# Patient Record
Sex: MALE | Race: WHITE | NOT HISPANIC OR LATINO | Employment: FULL TIME | URBAN - METROPOLITAN AREA
[De-identification: names, ages, dates, MRNs, and addresses within clinical notes are randomized per-mention and may not be internally consistent; named-entity substitution may affect disease eponyms.]

---

## 2024-07-21 ENCOUNTER — HOSPITAL ENCOUNTER (EMERGENCY)
Facility: HOSPITAL | Age: 24
Discharge: HOME | End: 2024-07-21
Attending: EMERGENCY MEDICINE | Admitting: EMERGENCY MEDICINE
Payer: COMMERCIAL

## 2024-07-21 ENCOUNTER — APPOINTMENT (OUTPATIENT)
Dept: GENERAL RADIOLOGY | Facility: HOSPITAL | Age: 24
End: 2024-07-21
Payer: COMMERCIAL

## 2024-07-21 VITALS
OXYGEN SATURATION: 97 % | RESPIRATION RATE: 16 BRPM | HEIGHT: 65 IN | WEIGHT: 148 LBS | DIASTOLIC BLOOD PRESSURE: 67 MMHG | HEART RATE: 55 BPM | TEMPERATURE: 98.1 F | BODY MASS INDEX: 24.66 KG/M2 | SYSTOLIC BLOOD PRESSURE: 106 MMHG

## 2024-07-21 DIAGNOSIS — R10.13 EPIGASTRIC PAIN: ICD-10-CM

## 2024-07-21 DIAGNOSIS — R07.9 CHEST PAIN, UNSPECIFIED TYPE: Primary | ICD-10-CM

## 2024-07-21 LAB
ALBUMIN SERPL-MCNC: 4.4 G/DL (ref 3.5–5.2)
ALBUMIN/GLOB SERPL: 1.8 G/DL
ALP SERPL-CCNC: 59 U/L (ref 39–117)
ALT SERPL W P-5'-P-CCNC: 21 U/L (ref 1–41)
ANION GAP SERPL CALCULATED.3IONS-SCNC: 8 MMOL/L (ref 5–15)
AST SERPL-CCNC: 21 U/L (ref 1–40)
BASOPHILS # BLD AUTO: 0.05 10*3/MM3 (ref 0–0.2)
BASOPHILS NFR BLD AUTO: 0.6 % (ref 0–1.5)
BILIRUB SERPL-MCNC: 0.6 MG/DL (ref 0–1.2)
BUN SERPL-MCNC: 13 MG/DL (ref 6–20)
BUN/CREAT SERPL: 10.8 (ref 7–25)
CALCIUM SPEC-SCNC: 9 MG/DL (ref 8.6–10.5)
CHLORIDE SERPL-SCNC: 102 MMOL/L (ref 98–107)
CO2 SERPL-SCNC: 29 MMOL/L (ref 22–29)
CREAT SERPL-MCNC: 1.2 MG/DL (ref 0.76–1.27)
D DIMER PPP FEU-MCNC: <0.27 MCGFEU/ML (ref 0–0.5)
DEPRECATED RDW RBC AUTO: 38.6 FL (ref 37–54)
EGFRCR SERPLBLD CKD-EPI 2021: 86.6 ML/MIN/1.73
EOSINOPHIL # BLD AUTO: 0.32 10*3/MM3 (ref 0–0.4)
EOSINOPHIL NFR BLD AUTO: 3.6 % (ref 0.3–6.2)
ERYTHROCYTE [DISTWIDTH] IN BLOOD BY AUTOMATED COUNT: 12.3 % (ref 12.3–15.4)
GEN 5 2HR TROPONIN T REFLEX: 7 NG/L
GLOBULIN UR ELPH-MCNC: 2.5 GM/DL
GLUCOSE SERPL-MCNC: 90 MG/DL (ref 65–99)
HCT VFR BLD AUTO: 52.8 % (ref 37.5–51)
HGB BLD-MCNC: 17.9 G/DL (ref 13–17.7)
HOLD SPECIMEN: NORMAL
IMM GRANULOCYTES # BLD AUTO: 0.02 10*3/MM3 (ref 0–0.05)
IMM GRANULOCYTES NFR BLD AUTO: 0.2 % (ref 0–0.5)
LIPASE SERPL-CCNC: 35 U/L (ref 13–60)
LYMPHOCYTES # BLD AUTO: 3.21 10*3/MM3 (ref 0.7–3.1)
LYMPHOCYTES NFR BLD AUTO: 36.1 % (ref 19.6–45.3)
MCH RBC QN AUTO: 29.3 PG (ref 26.6–33)
MCHC RBC AUTO-ENTMCNC: 33.9 G/DL (ref 31.5–35.7)
MCV RBC AUTO: 86.6 FL (ref 79–97)
MONOCYTES # BLD AUTO: 0.7 10*3/MM3 (ref 0.1–0.9)
MONOCYTES NFR BLD AUTO: 7.9 % (ref 5–12)
NEUTROPHILS NFR BLD AUTO: 4.6 10*3/MM3 (ref 1.7–7)
NEUTROPHILS NFR BLD AUTO: 51.6 % (ref 42.7–76)
NRBC BLD AUTO-RTO: 0 /100 WBC (ref 0–0.2)
NT-PROBNP SERPL-MCNC: <36 PG/ML (ref 0–450)
PLATELET # BLD AUTO: 261 10*3/MM3 (ref 140–450)
PMV BLD AUTO: 11 FL (ref 6–12)
POTASSIUM SERPL-SCNC: 4 MMOL/L (ref 3.5–5.2)
PROT SERPL-MCNC: 6.9 G/DL (ref 6–8.5)
RBC # BLD AUTO: 6.1 10*6/MM3 (ref 4.14–5.8)
SODIUM SERPL-SCNC: 139 MMOL/L (ref 136–145)
TROPONIN T DELTA: -1 NG/L
TROPONIN T SERPL HS-MCNC: 8 NG/L
WBC NRBC COR # BLD AUTO: 8.9 10*3/MM3 (ref 3.4–10.8)
WHOLE BLOOD HOLD COAG: NORMAL
WHOLE BLOOD HOLD SPECIMEN: NORMAL

## 2024-07-21 PROCEDURE — 96374 THER/PROPH/DIAG INJ IV PUSH: CPT

## 2024-07-21 PROCEDURE — 84484 ASSAY OF TROPONIN QUANT: CPT | Performed by: EMERGENCY MEDICINE

## 2024-07-21 PROCEDURE — 36415 COLL VENOUS BLD VENIPUNCTURE: CPT

## 2024-07-21 PROCEDURE — 71045 X-RAY EXAM CHEST 1 VIEW: CPT

## 2024-07-21 PROCEDURE — 83690 ASSAY OF LIPASE: CPT | Performed by: EMERGENCY MEDICINE

## 2024-07-21 PROCEDURE — 93005 ELECTROCARDIOGRAM TRACING: CPT | Performed by: EMERGENCY MEDICINE

## 2024-07-21 PROCEDURE — 85025 COMPLETE CBC W/AUTO DIFF WBC: CPT | Performed by: EMERGENCY MEDICINE

## 2024-07-21 PROCEDURE — 25810000003 SODIUM CHLORIDE 0.9 % SOLUTION: Performed by: NURSE PRACTITIONER

## 2024-07-21 PROCEDURE — 85379 FIBRIN DEGRADATION QUANT: CPT | Performed by: EMERGENCY MEDICINE

## 2024-07-21 PROCEDURE — 80053 COMPREHEN METABOLIC PANEL: CPT | Performed by: EMERGENCY MEDICINE

## 2024-07-21 PROCEDURE — 83880 ASSAY OF NATRIURETIC PEPTIDE: CPT | Performed by: EMERGENCY MEDICINE

## 2024-07-21 PROCEDURE — 96361 HYDRATE IV INFUSION ADD-ON: CPT

## 2024-07-21 PROCEDURE — 99284 EMERGENCY DEPT VISIT MOD MDM: CPT

## 2024-07-21 RX ORDER — SUCRALFATE 1 G/1
1 TABLET ORAL ONCE
Status: COMPLETED | OUTPATIENT
Start: 2024-07-21 | End: 2024-07-21

## 2024-07-21 RX ORDER — FAMOTIDINE 10 MG/ML
20 INJECTION, SOLUTION INTRAVENOUS ONCE
Status: COMPLETED | OUTPATIENT
Start: 2024-07-21 | End: 2024-07-21

## 2024-07-21 RX ORDER — SODIUM CHLORIDE 0.9 % (FLUSH) 0.9 %
10 SYRINGE (ML) INJECTION AS NEEDED
Status: DISCONTINUED | OUTPATIENT
Start: 2024-07-21 | End: 2024-07-21 | Stop reason: HOSPADM

## 2024-07-21 RX ORDER — SUCRALFATE 1 G/1
1 TABLET ORAL 4 TIMES DAILY
Qty: 40 TABLET | Refills: 0 | Status: SHIPPED | OUTPATIENT
Start: 2024-07-21 | End: 2024-07-31

## 2024-07-21 RX ORDER — ASPIRIN 81 MG/1
324 TABLET, CHEWABLE ORAL ONCE
Status: COMPLETED | OUTPATIENT
Start: 2024-07-21 | End: 2024-07-21

## 2024-07-21 RX ADMIN — FAMOTIDINE 20 MG: 10 INJECTION, SOLUTION INTRAVENOUS at 14:23

## 2024-07-21 RX ADMIN — SUCRALFATE 1 G: 1 TABLET ORAL at 11:35

## 2024-07-21 RX ADMIN — SODIUM CHLORIDE 1000 ML: 9 INJECTION, SOLUTION INTRAVENOUS at 14:22

## 2024-07-21 RX ADMIN — ASPIRIN 324 MG: 81 TABLET, CHEWABLE ORAL at 10:45

## 2024-07-21 NOTE — ED PROVIDER NOTES
EMERGENCY DEPARTMENT ENCOUNTER    Pt Name: Martínez Romano  MRN: [id number]  Pt :   2000  Room Number:    Date of encounter:  2024  PCP: Provider, No Known  ED Provider: PARUL Ellis    Historian: Patient      HPI:  Chief Complaint: Epigastric chest pain        Context: Martínez Romano is a 24 y.o. male who presents to the ED c/o midsternal epigastric pain since early morning.  Patient describes pain as burning, squeezing sensation.  Had similar episodes in the past, attributed to heartburn.  States today felt tingling to the arms that prompted him to come for evaluation.  Denies shortness of breath, nausea, denies cardiac history.  Social drinker, vapes, denies drug use.  Took Nexium that was prescribed last year for similar symptoms.  No significant relief.      PAST MEDICAL HISTORY  History reviewed. No pertinent past medical history.      PAST SURGICAL HISTORY  Past Surgical History:   Procedure Laterality Date    DENTAL PROCEDURE           FAMILY HISTORY  History reviewed. No pertinent family history.      SOCIAL HISTORY  Social History     Socioeconomic History    Marital status: Unknown   Tobacco Use    Smoking status: Never    Smokeless tobacco: Never   Vaping Use    Vaping status: Every Day    Substances: Nicotine   Substance and Sexual Activity    Alcohol use: Yes     Comment: WISDOM TEETH AND CYST REMOVED FROM GUM    Drug use: Never    Sexual activity: Defer         ALLERGIES  Patient has no known allergies.        REVIEW OF SYSTEMS  Review of Systems       All systems reviewed and negative except for those discussed in HPI.       PHYSICAL EXAM    I have reviewed the triage vital signs and nursing notes.    ED Triage Vitals [24 1023]   Temp Heart Rate Resp BP SpO2   98.1 °F (36.7 °C) (!) 133 16 146/96 100 %      Temp src Heart Rate Source Patient Position BP Location FiO2 (%)   Oral Monitor Sitting Right arm --       Physical Exam  GENERAL:   Appears in no acute distress.   Anxious  HENT: Nares patent.  EYES: No scleral icterus.  CV: Regular rhythm, regular rate.  RESPIRATORY: Normal effort.  No audible wheezes, rales or rhonchi.  ABDOMEN: Soft, nontender  MUSCULOSKELETAL: No deformities.   NEURO: Alert, moves all extremities, follows commands.  SKIN: Warm, dry, no rash visualized.      LAB RESULTS  Recent Results (from the past 24 hour(s))   ECG 12 Lead ED Triage Standing Order; Chest Pain    Collection Time: 07/21/24 10:28 AM   Result Value Ref Range    QT Interval 312 ms    QTC Interval 406 ms   Lavender Top    Collection Time: 07/21/24 10:39 AM   Result Value Ref Range    Extra Tube hold for add-on    Gold Top - SST    Collection Time: 07/21/24 10:39 AM   Result Value Ref Range    Extra Tube Hold for add-ons.    Gray Top    Collection Time: 07/21/24 10:39 AM   Result Value Ref Range    Extra Tube Hold for add-ons.    Light Blue Top    Collection Time: 07/21/24 10:39 AM   Result Value Ref Range    Extra Tube Hold for add-ons.    CBC Auto Differential    Collection Time: 07/21/24 10:39 AM    Specimen: Blood   Result Value Ref Range    WBC 8.90 3.40 - 10.80 10*3/mm3    RBC 6.10 (H) 4.14 - 5.80 10*6/mm3    Hemoglobin 17.9 (H) 13.0 - 17.7 g/dL    Hematocrit 52.8 (H) 37.5 - 51.0 %    MCV 86.6 79.0 - 97.0 fL    MCH 29.3 26.6 - 33.0 pg    MCHC 33.9 31.5 - 35.7 g/dL    RDW 12.3 12.3 - 15.4 %    RDW-SD 38.6 37.0 - 54.0 fl    MPV 11.0 6.0 - 12.0 fL    Platelets 261 140 - 450 10*3/mm3    Neutrophil % 51.6 42.7 - 76.0 %    Lymphocyte % 36.1 19.6 - 45.3 %    Monocyte % 7.9 5.0 - 12.0 %    Eosinophil % 3.6 0.3 - 6.2 %    Basophil % 0.6 0.0 - 1.5 %    Immature Grans % 0.2 0.0 - 0.5 %    Neutrophils, Absolute 4.60 1.70 - 7.00 10*3/mm3    Lymphocytes, Absolute 3.21 (H) 0.70 - 3.10 10*3/mm3    Monocytes, Absolute 0.70 0.10 - 0.90 10*3/mm3    Eosinophils, Absolute 0.32 0.00 - 0.40 10*3/mm3    Basophils, Absolute 0.05 0.00 - 0.20 10*3/mm3    Immature Grans, Absolute 0.02 0.00 - 0.05 10*3/mm3    nRBC  0.0 0.0 - 0.2 /100 WBC   High Sensitivity Troponin T    Collection Time: 07/21/24 11:39 AM    Specimen: Blood   Result Value Ref Range    HS Troponin T 8 <22 ng/L   Comprehensive Metabolic Panel    Collection Time: 07/21/24 11:39 AM    Specimen: Blood   Result Value Ref Range    Glucose 90 65 - 99 mg/dL    BUN 13 6 - 20 mg/dL    Creatinine 1.20 0.76 - 1.27 mg/dL    Sodium 139 136 - 145 mmol/L    Potassium 4.0 3.5 - 5.2 mmol/L    Chloride 102 98 - 107 mmol/L    CO2 29.0 22.0 - 29.0 mmol/L    Calcium 9.0 8.6 - 10.5 mg/dL    Total Protein 6.9 6.0 - 8.5 g/dL    Albumin 4.4 3.5 - 5.2 g/dL    ALT (SGPT) 21 1 - 41 U/L    AST (SGOT) 21 1 - 40 U/L    Alkaline Phosphatase 59 39 - 117 U/L    Total Bilirubin 0.6 0.0 - 1.2 mg/dL    Globulin 2.5 gm/dL    A/G Ratio 1.8 g/dL    BUN/Creatinine Ratio 10.8 7.0 - 25.0    Anion Gap 8.0 5.0 - 15.0 mmol/L    eGFR 86.6 >60.0 mL/min/1.73   Lipase    Collection Time: 07/21/24 11:39 AM    Specimen: Blood   Result Value Ref Range    Lipase 35 13 - 60 U/L   BNP    Collection Time: 07/21/24 11:39 AM    Specimen: Blood   Result Value Ref Range    proBNP <36.0 0.0 - 450.0 pg/mL   Green Top (Gel)    Collection Time: 07/21/24 11:39 AM   Result Value Ref Range    Extra Tube Hold for add-ons.    D-dimer, Quantitative    Collection Time: 07/21/24 11:39 AM    Specimen: Blood   Result Value Ref Range    D-Dimer, Quantitative <0.27 0.00 - 0.50 MCGFEU/mL   High Sensitivity Troponin T 2Hr    Collection Time: 07/21/24  1:50 PM    Specimen: Arm, Right; Blood   Result Value Ref Range    HS Troponin T 7 <22 ng/L    Troponin T Delta -1 >=-4 - <+4 ng/L   ECG 12 Lead ED Triage Standing Order; Chest Pain    Collection Time: 07/21/24  1:54 PM   Result Value Ref Range    QT Interval 366 ms    QTC Interval 362 ms       If labs were ordered, I independently reviewed the results and considered them in treating the patient.        RADIOLOGY  XR Chest 1 View    Result Date: 7/21/2024  XR CHEST 1 VW Date of Exam: 7/21/2024  10:41 AM EDT Indication: Chest Pain Triage Protocol Comparison: None available. Findings: Heart size and pulmonary vasculature are within normal limits. Lungs clear. Costophrenic angles are sharp. No pneumothorax     Impression: No active cardiopulmonary disease Electronically Signed: Estuardo Vides  7/21/2024 10:50 AM EDT  Workstation ID: [id number]     I ordered and independently reviewed the above noted radiographic studies.          PROCEDURES    Procedures    ECG 12 Lead ED Triage Standing Order; Chest Pain   Preliminary Result   Test Reason : ED Triage Standing Order~   Blood Pressure :   */*   mmHG   Vent. Rate :  59 BPM     Atrial Rate :  59 BPM      P-R Int : 144 ms          QRS Dur :  78 ms       QT Int : 366 ms       P-R-T Axes :  36  46  47 degrees      QTc Int : 362 ms      Sinus bradycardia   Otherwise normal ECG   When compared with ECG of 21-JUL-2024 10:28, (Unconfirmed)   Vent. rate has decreased BY  43 BPM      Referred By:            Confirmed By:       ECG 12 Lead ED Triage Standing Order; Chest Pain   Preliminary Result   Test Reason : ED Triage Standing Order~   Blood Pressure :   */*   mmHG   Vent. Rate : 102 BPM     Atrial Rate : 102 BPM      P-R Int : 162 ms          QRS Dur :  78 ms       QT Int : 312 ms       P-R-T Axes :  76  53  55 degrees      QTc Int : 406 ms      Sinus tachycardia   Otherwise normal ECG   No previous ECGs available      Referred By: EDMD           Confirmed By:           MEDICATIONS GIVEN IN ER    Medications   sodium chloride 0.9 % flush 10 mL (has no administration in time range)   sodium chloride 0.9 % bolus 1,000 mL (1,000 mL Intravenous New Bag 7/21/24 1422)   aspirin chewable tablet 324 mg (324 mg Oral Given 7/21/24 1045)   sucralfate (CARAFATE) tablet 1 g (1 g Oral Given 7/21/24 1135)   famotidine (PEPCID) injection 20 mg (20 mg Intravenous Given 7/21/24 1423)         MEDICAL DECISION MAKING, PROGRESS, and CONSULTS    All labs, if obtained, have been independently  reviewed by me.  All radiology studies, if obtained, have been reviewed by me and the radiologist dictating the report.  All EKG's, if obtained, have been independently viewed and interpreted by me/my attending physician.      Discussion below represents my analysis of pertinent findings related to patient's condition, differential diagnosis, treatment plan and final disposition.  Patient is a 24-year-old healthy male who presents for evaluation of epigastric chest pain since this morning.  On physical exam he was nontoxic-appearing with stable vital signs, abdomen was soft nontender on palpation, lab work was unremarkable including delta troponins and D-dimer, mild hemoconcentration noted, EKG shows normal sinus rhythm, chest x-ray and no acute cardiopulmonary process.  Patient received fluids, Pepcid, p.o. Carafate with improvement of the symptoms.  He reported slight tightness at the epigastrium without any pain.  I believe his pain is GI in nature.  Discharged home in stable condition with prescription for Carafate, advised to follow-up with PCP, GI for further evaluation.  Return precaution for any worsening discussed.        HEART Score: 0                Differential diagnosis:    Angina, esophagitis, GERD, hiatal hernia, gastritis, costochondritis    Additional sources:    - Discussed/ obtained information from independent historians:      - External (non-ED) record review:      - Chronic or social conditions impacting care:      - Shared decision making: Patient      Orders placed during this visit:  Orders Placed This Encounter   Procedures    XR Chest 1 View    [place] Draw    High Sensitivity Troponin T    Comprehensive Metabolic Panel    Lipase    BNP    CBC Auto Differential    D-dimer, Quantitative    High Sensitivity Troponin T 2Hr    NPO Diet NPO Type: Strict NPO    Undress & Gown    Continuous Pulse Oximetry    Oxygen Therapy- Nasal Cannula; Titrate 1-6 LPM Per SpO2; 90 - 95%    ECG 12 Lead ED  Triage Standing Order; Chest Pain    ECG 12 Lead ED Triage Standing Order; Chest Pain    Insert Peripheral IV    CBC & Differential    Green Top (Gel)    Lavender Top    Gold Top - SST    Gray Top    Light Blue Top         Additional orders considered but not ordered  Chest CT    ED Course:    Consultants:                  Shared Decision Making:  After my consideration of clinical presentation and any laboratory/radiology studies obtained, I discussed the findings with the patient/patient representative who is in agreement with the treatment plan and the final disposition.   Risks and benefits of discharge and/or observation/admission were discussed.       AS OF 15:03 EDT VITALS:    BP - 111/70  HR - 63  TEMP - 98.1 °F (36.7 °C) (Oral)  O2 SATS - 98%                  DIAGNOSIS  Final diagnoses:   Epigastric pain   Chest pain, unspecified type         DISPOSITION  DISCHARGE    Patient discharged in stable condition.    Reviewed implications of results, diagnosis, meds, responsibility to follow up, warning signs and symptoms of possible worsening, potential complications and reasons to return to ER.    Patient/Family voiced understanding of above instructions.    Discussed plan for discharge, as there is no emergent indication for admission.  Pt/family is agreeable and understands need for follow up and possible repeat testing.  Pt/family is aware that discharge does not mean that nothing is wrong but that it indicates no emergency is currently present that requires admission and they must continue care with follow-up as given below or with a physician of their choice.     FOLLOW-UP  Provider, No Known  [address] EMERGENCY DEPARTMENT  [address] [facility] [address] [phone]    If symptoms worsen         Medication List        New Prescriptions      sucralfate 1 g tablet  Commonly known as: CARAFATE  Take 1 tablet by mouth 4  (Four) Times a Day for 10 days.               Where to Get Your Medications        These medications were sent to [facility] [id number] - [place], [place] - [address] - [phone]  - [phone]  [address], [facility] [address]      Phone: [phone]   sucralfate 1 g tablet            Please note that portions of this document were completed with voice recognition software.     PARUL Ellis   07/21/24   15:03 EDT        Anika Mcqueen, APRDENIA  07/21/24 1500

## 2024-07-24 LAB
QT INTERVAL: 312 MS
QT INTERVAL: 366 MS
QTC INTERVAL: 362 MS
QTC INTERVAL: 406 MS

## 2024-09-12 ENCOUNTER — OFFICE VISIT (OUTPATIENT)
Dept: GASTROENTEROLOGY | Facility: CLINIC | Age: 24
End: 2024-09-12
Payer: COMMERCIAL

## 2024-09-12 VITALS
BODY MASS INDEX: 25.16 KG/M2 | WEIGHT: 151 LBS | DIASTOLIC BLOOD PRESSURE: 60 MMHG | OXYGEN SATURATION: 98 % | SYSTOLIC BLOOD PRESSURE: 124 MMHG | HEART RATE: 57 BPM | HEIGHT: 65 IN

## 2024-09-12 DIAGNOSIS — R07.9 CHEST PAIN, UNSPECIFIED TYPE: ICD-10-CM

## 2024-09-12 DIAGNOSIS — R10.13 EPIGASTRIC PAIN: Primary | ICD-10-CM

## 2024-09-12 DIAGNOSIS — K62.5 BLOOD PER RECTUM: ICD-10-CM

## 2024-09-12 DIAGNOSIS — K59.04 CHRONIC IDIOPATHIC CONSTIPATION: ICD-10-CM

## 2024-09-12 RX ORDER — POLYETHYLENE GLYCOL 3350 17 G/17G
17 POWDER, FOR SOLUTION ORAL DAILY
Qty: 510 G | Refills: 11 | Status: SHIPPED | OUTPATIENT
Start: 2024-09-12

## 2024-09-12 NOTE — PATIENT INSTRUCTIONS
Follow a diet as recommended by your health care provider. This may involve avoiding foods and drinks such as:  Coffee and tea (with or without caffeine).  Drinks that contain alcohol.  Energy drinks and sports drinks.  Carbonated drinks or sodas.  Chocolate and cocoa.  Peppermint and mint flavorings.  Garlic and onions.  Horseradish.  Spicy and acidic foods, including peppers, chili powder, winston powder, vinegar, hot sauces, and barbecue sauce.  Citrus fruit juices and citrus fruits, such as oranges, sophie, and limes.  Tomato-based foods, such as red sauce, chili, salsa, and pizza with red sauce.  Fried and fatty foods, such as donuts, french fries, potato chips, and high-fat dressings.    Eat small, frequent meals instead of large meals.  Avoid drinking large amounts of liquid with your meals.  Avoid eating meals during the 2-3 hours before bedtime.  Avoid lying down right after you eat.

## 2024-10-25 ENCOUNTER — OUTSIDE FACILITY SERVICE (OUTPATIENT)
Dept: GASTROENTEROLOGY | Facility: CLINIC | Age: 24
End: 2024-10-25
Payer: COMMERCIAL

## 2024-10-25 PROCEDURE — 43239 EGD BIOPSY SINGLE/MULTIPLE: CPT | Performed by: INTERNAL MEDICINE

## 2024-10-25 PROCEDURE — 88305 TISSUE EXAM BY PATHOLOGIST: CPT | Performed by: INTERNAL MEDICINE

## 2024-10-28 ENCOUNTER — LAB REQUISITION (OUTPATIENT)
Dept: LAB | Facility: HOSPITAL | Age: 24
End: 2024-10-28
Payer: COMMERCIAL

## 2024-10-28 DIAGNOSIS — R10.13 EPIGASTRIC PAIN: ICD-10-CM

## 2024-10-28 DIAGNOSIS — K21.00 GASTRO-ESOPHAGEAL REFLUX DISEASE WITH ESOPHAGITIS, WITHOUT BLEEDING: ICD-10-CM

## 2024-10-30 LAB — REF LAB TEST METHOD: NORMAL

## 2024-11-27 ENCOUNTER — OFFICE VISIT (OUTPATIENT)
Dept: GASTROENTEROLOGY | Facility: CLINIC | Age: 24
End: 2024-11-27
Payer: COMMERCIAL

## 2024-11-27 VITALS
DIASTOLIC BLOOD PRESSURE: 60 MMHG | BODY MASS INDEX: 26.33 KG/M2 | WEIGHT: 158 LBS | HEIGHT: 65 IN | OXYGEN SATURATION: 97 % | SYSTOLIC BLOOD PRESSURE: 110 MMHG | HEART RATE: 68 BPM

## 2024-11-27 DIAGNOSIS — K21.00 GASTROESOPHAGEAL REFLUX DISEASE WITH ESOPHAGITIS WITHOUT HEMORRHAGE: ICD-10-CM

## 2024-11-27 DIAGNOSIS — L53.9 ERYTHEMA OF SKIN: ICD-10-CM

## 2024-11-27 DIAGNOSIS — R07.9 CHEST PAIN, UNSPECIFIED TYPE: Primary | ICD-10-CM

## 2024-11-27 PROCEDURE — 99214 OFFICE O/P EST MOD 30 MIN: CPT | Performed by: NURSE PRACTITIONER

## 2024-11-27 RX ORDER — PANTOPRAZOLE SODIUM 40 MG/1
1 TABLET, DELAYED RELEASE ORAL DAILY
COMMUNITY
Start: 2024-10-27

## 2024-11-27 NOTE — PROGRESS NOTES
GASTROENTEROLOGY OFFICE NOTE    Martínez Romano  [id number]  2000    CARE TEAM  Patient Care Team:  Provider, No Known as PCP - General    Referring Provider: No ref. provider found    Chief Complaint   Patient presents with    Abdominal Pain     2mth follow up.   Follow up from EGD.     Constipation     2mth follow up.         HISTORY OF PRESENT ILLNESS:   Martínez Romano is a 24 y.o. male (6th grade  at [employer]) who  returns for 2 to 3-month follow-up regarding epigastric pain, chest pain with prior emergency room visit where he was prescribed Carafate, Pepcid.      At last office visit, he reported previously taking omeprazole with improvement in symptoms.  He reported increased intake of fiber and starting fiber supplement with improvement in bowel habits.  At last office visit on 9/12/204,  lifestyle modifications for reflux recommended, omeprazole daily prescribed, EGD planned for additional evaluation.  We also discussed possible differential of irritable bowel syndrome with constipation contributing to symptoms for which I recommended daily dose of MiraLAX or consider MiraLAX up to 3 times daily.      10/25/2024 EGD per Dr. Burch revealed LA grade a reflux esophagitis and otherwise appeared normal.  Biopsy of duodenum without abnormality.  Stomach biopsy without abnormality and negative for H. pylori.  Biopsy of esophagus revealed changes of reflux.    He is unable to recall if he was taking omeprazole daily after appointment in September and prior to EGD.  He reports taking pantoprazole 40 mg daily since EGD.  He has experienced occasional chest tightness but the symptoms that took him to the emergency department prior to last office visit have not returned.    He was recently diagnosed and treated for influenza.  He had rash on left forearm that has resolved.  He now reports areas of redness and swelling to his palms.      Past Medical History:   Diagnosis Date    Reflux  "esophagitis         Past Surgical History:   Procedure Laterality Date    DENTAL PROCEDURE      ENDOSCOPY  10/25/2024    Kiersten        Current Outpatient Medications on File Prior to Visit   Medication Sig    pantoprazole (PROTONIX) 40 MG EC tablet Take 1 tablet by mouth Daily.    [DISCONTINUED] omeprazole (priLOSEC) 20 MG capsule Take 1 capsule by mouth Daily. 30 minutes prior to a meal (Patient not taking: Reported on 11/27/2024)    [DISCONTINUED] polyethylene glycol (MiraLax) 17 GM/SCOOP powder Take 17 g by mouth Daily. Mix in 8 oz of any liquid once daily (Patient not taking: Reported on 11/27/2024)     No current facility-administered medications on file prior to visit.       No Known Allergies    Family History   Problem Relation Age of Onset    Colon cancer Neg Hx        Social History     Socioeconomic History    Marital status: Unknown   Tobacco Use    Smoking status: Never    Smokeless tobacco: Never   Vaping Use    Vaping status: Every Day    Substances: Nicotine   Substance and Sexual Activity    Alcohol use: Yes     Comment: WISDOM TEETH AND CYST REMOVED FROM GUM    Drug use: Never    Sexual activity: Defer       PHYSICAL EXAM   /60 (BP Location: Left arm, Patient Position: Sitting, Cuff Size: Adult)   Pulse 68   Ht 165.1 cm (65\")   Wt 71.7 kg (158 lb)   SpO2 97%   BMI 26.29 kg/m²   Physical Exam  Constitutional:       General: He is not in acute distress.     Appearance: He is not toxic-appearing.   HENT:      Head: Normocephalic and atraumatic. No contusion.      Right Ear: External ear normal.      Left Ear: External ear normal.   Eyes:      General: Lids are normal. No scleral icterus.        Right eye: No discharge.         Left eye: No discharge.      Extraocular Movements: Extraocular movements intact.   Neck:      Trachea: Trachea normal.      Comments: No visible mass  No visible adenopathy  Cardiovascular:      Rate and Rhythm: Normal rate.   Pulmonary:      Effort: No respiratory " distress.      Comments: Symmetrical expansion    Abdominal:      Palpations: Abdomen is soft. There is no mass.      Tenderness: There is no abdominal tenderness.   Musculoskeletal:      Comments: Symmetrical movement of upper extremities  Symmetrical movement of lower extremities  No visible deformities   Skin:     General: Skin is warm and dry.      Coloration: Skin is not jaundiced.      Comments: 2 to 3 areas of erythema noted to palms   Neurological:      General: No focal deficit present.      Mental Status: He is alert and oriented to person, place, and time.   Psychiatric:         Mood and Affect: Mood normal.         Behavior: Behavior normal.         Thought Content: Thought content normal.     Results Review:  7/21/2024 chest x-ray without active cardiopulmonary disease.  Troponin, D-dimer, BNP, lipase normal    10/25/2024 EGD per Dr. Burch revealed LA grade a reflux esophagitis and otherwise appeared normal.  Biopsy of duodenum without abnormality.  Stomach biopsy without abnormality and negative for H. pylori.  Biopsy of esophagus revealed changes of reflux.  CMP          7/21/2024    11:39   CMP   Glucose 90    BUN 13    Creatinine 1.20    EGFR 86.6    Sodium 139    Potassium 4.0    Chloride 102    Calcium 9.0    Total Protein 6.9    Albumin 4.4    Globulin 2.5    Total Bilirubin 0.6    Alkaline Phosphatase 59    AST (SGOT) 21    ALT (SGPT) 21    Albumin/Globulin Ratio 1.8    BUN/Creatinine Ratio 10.8    Anion Gap 8.0      CBC          7/21/2024    10:39   CBC   WBC 8.90    RBC 6.10    Hemoglobin 17.9    Hematocrit 52.8    MCV 86.6    MCH 29.3    MCHC 33.9    RDW 12.3    Platelets 261        ASSESSMENT / PLAN  1. Chest pain, unspecified type  2. Gastroesophageal reflux disease with esophagitis without hemorrhage  -Suspect history of chest pain and epigastric pain to have been due to reflux as EGD on 10/25/2024 revealed LA grade a reflux esophagitis.  -Lifestyle modifications for reflux previously  provided.  He was previously prescribed omeprazole 20 mg daily.  Following EGD Dr. Burch prescribed pantoprazole 40 mg daily which patient continues at this time with improved symptoms.  -We discussed consideration to take pantoprazole for 6 months and then we can consider trial off of medication after 6 months.  Alternatively patient could consider taking pantoprazole 40 mg daily until after the first of the new year and then consider trial off pantoprazole.  -Check CMP and magnesium prior to next office visit due to taking proton pump inhibitor  -We previously discussed differential of symptomatic cholelithiasis or gallbladder sludge for which ultrasound gallbladder could be considered in the future  - Comprehensive Metabolic Panel; Future  - Magnesium; Future  3. Erythema of skin  -Recommend he consider use of over-the-counter steroid such as hydrocortisone cream for up to 7 days.  If no improvement or worsening skin abnormalities recommend he go to primary care provider for evaluation    We previously discussed blood per rectum and chronic constipation with report at last office visit of improvement in bowel habits with increased dietary fiber and taking fiber supplement.  I recommended daily dose of MiraLAX at last office visit.  If he did not have good productive bowel movements once or twice a day he could consider MiraLAX up to 3 times daily, every 4 hours as needed    Return in about 6 months (around 5/27/2025).    Nina Campo, APRN  11/27/2024

## 2025-01-27 ENCOUNTER — PRIOR AUTHORIZATION (OUTPATIENT)
Dept: GASTROENTEROLOGY | Facility: CLINIC | Age: 25
End: 2025-01-27
Payer: COMMERCIAL

## 2025-03-27 ENCOUNTER — HOSPITAL ENCOUNTER (EMERGENCY)
Facility: HOSPITAL | Age: 25
Discharge: HOME | End: 2025-03-27
Attending: HOSPITALIST
Payer: COMMERCIAL

## 2025-03-27 VITALS
HEART RATE: 69 BPM | TEMPERATURE: 98.4 F | DIASTOLIC BLOOD PRESSURE: 72 MMHG | OXYGEN SATURATION: 100 % | SYSTOLIC BLOOD PRESSURE: 117 MMHG | HEIGHT: 65 IN | BODY MASS INDEX: 24.99 KG/M2 | RESPIRATION RATE: 20 BRPM | WEIGHT: 150 LBS

## 2025-03-27 DIAGNOSIS — R19.7 DIARRHEA, UNSPECIFIED TYPE: Primary | ICD-10-CM

## 2025-03-27 DIAGNOSIS — K62.5 RECTAL BLEEDING: ICD-10-CM

## 2025-03-27 LAB
ALBUMIN SERPL-MCNC: 4.8 G/DL (ref 3.4–4.8)
ALBUMIN/GLOB SERPL: 1.7 {RATIO} (ref 0.8–2)
ALP SERPL-CCNC: 72 U/L (ref 25–100)
ALT SERPL-CCNC: 20 U/L (ref 4–36)
ANION GAP SERPL CALCULATED.3IONS-SCNC: 12 MMOL/L (ref 3–16)
AST SERPL-CCNC: 20 U/L (ref 8–33)
BASOPHILS # BLD: 0 K/UL (ref 0–0.1)
BASOPHILS NFR BLD: 0.6 %
BILIRUB SERPL-MCNC: 0.3 MG/DL (ref 0.3–1.2)
BILIRUB UR QL STRIP.AUTO: NEGATIVE
BUN SERPL-MCNC: 11 MG/DL (ref 6–20)
CALCIUM SERPL-MCNC: 9.8 MG/DL (ref 8.3–10.6)
CHLORIDE SERPL-SCNC: 103 MMOL/L (ref 98–107)
CLARITY UR: CLEAR
CO2 SERPL-SCNC: 27 MMOL/L (ref 20–30)
COLOR UR: YELLOW
CREAT SERPL-MCNC: 1.2 MG/DL (ref 0.4–1.2)
EOSINOPHIL # BLD: 0.2 K/UL (ref 0–0.4)
EOSINOPHIL NFR BLD: 2.3 %
ERYTHROCYTE [DISTWIDTH] IN BLOOD BY AUTOMATED COUNT: 12.1 % (ref 11–16)
GFR SERPLBLD CREATININE-BSD FMLA CKD-EPI: 86 ML/MIN/{1.73_M2}
GLOBULIN SER CALC-MCNC: 2.9 G/DL
GLUCOSE SERPL-MCNC: 73 MG/DL (ref 74–106)
GLUCOSE UR STRIP.AUTO-MCNC: NEGATIVE MG/DL
HCT VFR BLD AUTO: 46.9 % (ref 40–54)
HEMOCCULT SP1 STL QL: NORMAL
HGB BLD-MCNC: 15.9 G/DL (ref 13–18)
HGB UR QL STRIP.AUTO: NEGATIVE
IMM GRANULOCYTES # BLD: 0 K/UL
IMM GRANULOCYTES NFR BLD: 0.3 % (ref 0–5)
KETONES UR STRIP.AUTO-MCNC: NEGATIVE MG/DL
LEUKOCYTE ESTERASE UR QL STRIP.AUTO: NEGATIVE
LIPASE SERPL-CCNC: 38 U/L (ref 5.6–51.3)
LYMPHOCYTES # BLD: 1.8 K/UL (ref 1.5–4)
LYMPHOCYTES NFR BLD: 26.4 %
MCH RBC QN AUTO: 29.5 PG (ref 27–32)
MCHC RBC AUTO-ENTMCNC: 33.9 G/DL (ref 31–35)
MCV RBC AUTO: 87 FL (ref 80–100)
MONOCYTES # BLD: 0.8 K/UL (ref 0.2–0.8)
MONOCYTES NFR BLD: 10.8 %
NEUTROPHILS # BLD: 4.1 K/UL (ref 2–7.5)
NEUTS SEG NFR BLD: 59.6 %
NITRITE UR QL STRIP.AUTO: NEGATIVE
PH UR STRIP.AUTO: 7 [PH] (ref 5–8)
PLATELET # BLD AUTO: 265 K/UL (ref 150–400)
PMV BLD AUTO: 10.7 FL (ref 6–10)
POTASSIUM SERPL-SCNC: 3.9 MMOL/L (ref 3.4–5.1)
PROT SERPL-MCNC: 7.7 G/DL (ref 6.4–8.3)
PROT UR STRIP.AUTO-MCNC: NEGATIVE MG/DL
RBC # BLD AUTO: 5.39 M/UL (ref 4.5–6)
SODIUM SERPL-SCNC: 142 MMOL/L (ref 136–145)
SP GR UR STRIP.AUTO: 1.01 (ref 1–1.03)
UA COMPLETE W REFLEX CULTURE PNL UR: NORMAL
UA DIPSTICK W REFLEX MICRO PNL UR: NORMAL
URN SPEC COLLECT METH UR: NORMAL
UROBILINOGEN UR STRIP-ACNC: 0.2 E.U./DL
WBC # BLD AUTO: 6.9 K/UL (ref 4–11)

## 2025-03-27 PROCEDURE — 80053 COMPREHEN METABOLIC PANEL: CPT

## 2025-03-27 PROCEDURE — 81003 URINALYSIS AUTO W/O SCOPE: CPT

## 2025-03-27 PROCEDURE — 83690 ASSAY OF LIPASE: CPT

## 2025-03-27 PROCEDURE — 85025 COMPLETE CBC W/AUTO DIFF WBC: CPT

## 2025-03-27 PROCEDURE — 99284 EMERGENCY DEPT VISIT MOD MDM: CPT

## 2025-03-27 PROCEDURE — 82270 OCCULT BLOOD FECES: CPT

## 2025-03-27 PROCEDURE — 36415 COLL VENOUS BLD VENIPUNCTURE: CPT

## 2025-03-27 PROCEDURE — 2580000003 HC RX 258: Performed by: HOSPITALIST

## 2025-03-27 RX ORDER — FEXOFENADINE HCL 180 MG/1
180 TABLET ORAL DAILY
COMMUNITY

## 2025-03-27 RX ORDER — 0.9 % SODIUM CHLORIDE 0.9 %
1000 INTRAVENOUS SOLUTION INTRAVENOUS ONCE
Status: COMPLETED | OUTPATIENT
Start: 2025-03-27 | End: 2025-03-27

## 2025-03-27 RX ORDER — ASCORBIC ACID 500 MG
500 TABLET ORAL DAILY
COMMUNITY

## 2025-03-27 RX ADMIN — SODIUM CHLORIDE 1000 ML: 0.9 INJECTION, SOLUTION INTRAVENOUS at 10:25

## 2025-03-27 ASSESSMENT — LIFESTYLE VARIABLES
HOW MANY STANDARD DRINKS CONTAINING ALCOHOL DO YOU HAVE ON A TYPICAL DAY: 1 OR 2
HOW OFTEN DO YOU HAVE A DRINK CONTAINING ALCOHOL: MONTHLY OR LESS
HOW OFTEN DO YOU HAVE A DRINK CONTAINING ALCOHOL: 2-4 TIMES A MONTH

## 2025-03-27 ASSESSMENT — PAIN - FUNCTIONAL ASSESSMENT
PAIN_FUNCTIONAL_ASSESSMENT: NONE - DENIES PAIN
PAIN_FUNCTIONAL_ASSESSMENT: NONE - DENIES PAIN

## 2025-03-27 NOTE — ED PROVIDER NOTES
[facility]PART[facility]  EMERGENCY DEPARTMENT ENCOUNTER        Pt Name: Easton Kennedy  MRN: [id number]  Birthdate 2000  Date of evaluation: 3/27/2025  Provider: Rolly Cleary DO  PCP: No primary care provider on file.  Note Started: 9:51 AM EDT 3/27/25    CHIEF COMPLAINT       Chief Complaint   Patient presents with    Rectal Bleeding     Patient reports bloody diarrhea x1 episode today.       HISTORY OF PRESENT ILLNESS: 1 or more Elements     History from : Patient    Limitations to history : None    Easton Kennedy is a 24 y.o. male who presents to Emergency Department for episode of bloody diarrhea today.  Patient states he was at work today around [id number] when he had sensation of like he was hungry and his abdomen and then felt that he needed to get to the bathroom.  Patient states had episode of bloody diarrhea.  He states was blood all over.   Denies trauma to the area and the last anal intercourse was a month ago but denies ever having any bleeding secondary to this.  Patient states he has had intermittent rectal bleeding on and off for the past he does follow with a GI physician.  States he had an episode yesterday also that was mildly bloody but did not think much about it.  However today states there was just more blood than what he is had in the past.  He denied any other symptoms and still he states after he googled his symptoms and thought he was dying he states he then started to get a little lightheaded and dizzy had some mild nausea and was having some anxiety but that has also almost completely passed.  He denies any abdominal pain at this time.  Denies any discomfort.  States he has not had any further abdominal discomfort after the episode just prior to the bloody diarrhea.  He has not had any other episodes of bowel movement however once he got here he states he feels like he does need to to have bowel movement.  He states that he did eat Mexican food yesterday

## 2025-05-30 ENCOUNTER — OFFICE VISIT (OUTPATIENT)
Dept: GASTROENTEROLOGY | Facility: CLINIC | Age: 25
End: 2025-05-30
Payer: COMMERCIAL

## 2025-05-30 VITALS
HEART RATE: 89 BPM | WEIGHT: 158 LBS | DIASTOLIC BLOOD PRESSURE: 88 MMHG | TEMPERATURE: 98 F | SYSTOLIC BLOOD PRESSURE: 118 MMHG | HEIGHT: 65 IN | BODY MASS INDEX: 26.33 KG/M2

## 2025-05-30 DIAGNOSIS — K62.5 BLOOD PER RECTUM: ICD-10-CM

## 2025-05-30 DIAGNOSIS — K21.00 GASTROESOPHAGEAL REFLUX DISEASE WITH ESOPHAGITIS WITHOUT HEMORRHAGE: Primary | ICD-10-CM

## 2025-05-30 DIAGNOSIS — R07.9 CHEST PAIN, UNSPECIFIED TYPE: ICD-10-CM

## 2025-05-30 DIAGNOSIS — J30.9 ALLERGIC RHINITIS, UNSPECIFIED SEASONALITY, UNSPECIFIED TRIGGER: ICD-10-CM

## 2025-05-30 PROCEDURE — 99214 OFFICE O/P EST MOD 30 MIN: CPT | Performed by: NURSE PRACTITIONER

## 2025-05-30 RX ORDER — PANTOPRAZOLE SODIUM 40 MG/1
40 TABLET, DELAYED RELEASE ORAL DAILY
Qty: 90 TABLET | Refills: 3 | Status: SHIPPED | OUTPATIENT
Start: 2025-05-30

## 2025-05-30 RX ORDER — ASCORBIC ACID 500 MG
500 TABLET ORAL DAILY
COMMUNITY

## 2025-05-30 RX ORDER — FEXOFENADINE HCL 180 MG/1
180 TABLET ORAL DAILY
COMMUNITY

## 2025-05-30 NOTE — PROGRESS NOTES
GASTROENTEROLOGY OFFICE NOTE    Martínez Romano  [id number]  2000    CARE TEAM  Patient Care Team:  Provider, No Known as PCP - Nina Jones APRN as Nurse Practitioner (Gastroenterology)    Referring Provider: No ref. provider found    Chief Complaint   Patient presents with    6 month follow         HISTORY OF PRESENT ILLNESS:   Martínez Romano is a 24 y.o. male (6th grade  at [employer]) who  returns for 6-month follow-up regarding epigastric pain, chest pain, reflux esophagitis identified at time of EGD 10/25/2024.  History of taking omeprazole with improvement in symptoms.  He has history of constipation and blood per rectum. He previously reported increased intake of fiber and starting fiber supplement with improvement in bowel habits.  We have previously discussed suspected irritable bowel syndrome with constipation for which MiraLAX was recommended.  Following EGD, pantoprazole 40 mg daily prescribed.  At last office visit he reported taking pantoprazole 40 mg daily with occasional chest tightness but since that visit, symptoms have not returned that previously took him to the emergency department.    Patient evaluated in the emergency department at [facility] on 3/27/2025 due to bloody diarrhea.      3/27/2025 occult stool test negative.  CBC with normal white blood cell count, normal hemoglobin, normal hematocrit and normal platelet count.  Comprehensive metabolic panel revealed low glucose and otherwise normal.  Lipase normal.  History of Present Illness  The patient is a 24-year-old male who presents for evaluation of bloody diarrhea, chest pain, and allergies.    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 He reports no current presence of blood in his stool. He has not undergone a colonoscopy to date.    He  has been experiencing recurrent chest pain over the past few weeks, which he suspects may be due to a psychological origin. He describes the sensation as if his chest cavity has contracted, causing his heart to strike against it. He has an upcoming appointment with a psychiatrist next week. He has previously been prescribed pantoprazole but when he tried to refill pantoprazole, the pharmacy informed him he was unable to do so. He recalls exhausting one or two prescriptions and being informed that refills were no longer permitted, despite the medication bottle indicating two or three remaining refills. Consequently, he decided to discontinue the medication for a period. There is documentation in medical record of [place] approval for pantoprazole from 1/27/2025 to 1/27/2028    He has been experiencing mucus drainage for the past three to four weeks. He reports a decrease in coughing over the past few days but experienced increased nasal congestion last night after spending time outdoors. He believes his symptoms are allergy-related rather than indicative of a sinus infection. He is currently taking Allegra daily and has been using leftover cough medicine from a previous flu episode, followed by Robitussin when the former was depleted. He also uses Flonase but admits to inconsistent use.        Past Medical History:   Diagnosis Date    Reflux esophagitis         Past Surgical History:   Procedure Laterality Date    DENTAL PROCEDURE      ENDOSCOPY  10/25/2024    [facility]        Current Outpatient Medications on File Prior to Visit   Medication Sig    fexofenadine (ALLEGRA) 180 MG tablet Take 1 tablet by mouth Daily.    FIBER ADULT GUMMIES PO Take 2 each by mouth Daily.    vitamin C (ASCORBIC ACID) 500 MG tablet Take 1 tablet by mouth Daily.    pantoprazole (PROTONIX) 40 MG EC tablet Take 1 tablet by mouth Daily. (Patient not taking: Reported on 5/30/2025)     No current facility-administered medications on file prior to visit.  "      No Known Allergies    Family History   Problem Relation Age of Onset    Colon cancer Neg Hx        Social History     Socioeconomic History    Marital status: Unknown   Tobacco Use    Smoking status: Never    Smokeless tobacco: Never   Vaping Use    Vaping status: Every Day    Substances: Nicotine   Substance and Sexual Activity    Alcohol use: Yes     Comment: WISDOM TEETH AND CYST REMOVED FROM GUM    Drug use: Never    Sexual activity: Defer       PHYSICAL EXAM   /88 (BP Location: Right arm, Patient Position: Sitting, Cuff Size: Adult)   Pulse 89   Temp 98 °F (36.7 °C) (Temporal)   Ht 165.1 cm (65\")   Wt 71.7 kg (158 lb)   BMI 26.29 kg/m²   Physical Exam  Constitutional:       General: He is not in acute distress.     Appearance: He is not toxic-appearing.   HENT:      Head: Normocephalic and atraumatic. No contusion.      Right Ear: External ear normal.      Left Ear: External ear normal.   Eyes:      General: Lids are normal. No scleral icterus.        Right eye: No discharge.         Left eye: No discharge.      Extraocular Movements: Extraocular movements intact.   Neck:      Trachea: Trachea normal.      Comments: No visible mass  No visible adenopathy  Cardiovascular:      Rate and Rhythm: Normal rate.   Pulmonary:      Effort: No respiratory distress.      Comments: Symmetrical expansion    Abdominal:      Palpations: Abdomen is soft. There is no mass.      Tenderness: There is no abdominal tenderness.   Musculoskeletal:      Comments: Symmetrical movement of upper extremities  Symmetrical movement of lower extremities  No visible deformities   Skin:     General: Skin is warm and dry.      Coloration: Skin is not jaundiced.   Neurological:      General: No focal deficit present.      Mental Status: He is alert and oriented to person, place, and time.   Psychiatric:         Mood and Affect: Mood normal.         Behavior: Behavior normal.         Thought Content: Thought content normal. "         Results Review:  7/21/2024 chest x-ray without active cardiopulmonary disease.  Troponin, D-dimer, BNP, lipase normal     10/25/2024 EGD per Dr. Burch revealed LA grade a reflux esophagitis and otherwise appeared normal.  Biopsy of duodenum without abnormality.  Stomach biopsy without abnormality and negative for H. pylori.  Biopsy of esophagus revealed changes of reflux.    3/27/2025 occult stool test negative.  CBC with normal white blood cell count, normal hemoglobin, normal hematocrit and normal platelet count.  Comprehensive metabolic panel revealed low glucose and otherwise normal.  Lipase normal.  CMP          7/21/2024    11:39   CMP   Glucose 90    BUN 13    Creatinine 1.20    EGFR 86.6    Sodium 139    Potassium 4.0    Chloride 102    Calcium 9.0    Total Protein 6.9    Albumin 4.4    Globulin 2.5    Total Bilirubin 0.6    Alkaline Phosphatase 59    AST (SGOT) 21    ALT (SGPT) 21    Albumin/Globulin Ratio 1.8    BUN/Creatinine Ratio 10.8    Anion Gap 8.0      CBC          7/21/2024    10:39 3/27/2025    10:19   CBC   WBC 8.90  6.9       RBC 6.10  5.39       Hemoglobin 17.9  15.9       Hematocrit 52.8  46.9       MCV 86.6  87       MCH 29.3  29.5       MCHC 33.9  33.9       RDW 12.3  12.1       Platelets 261  265          Details          This result is from an external source.               ASSESSMENT / PLAN    Assessment & Plan      1. Gastroesophageal reflux disease with esophagitis without hemorrhage  2. Chest pain, unspecified type    -Suspect history of chest pain and epigastric pain to have been due to reflux as EGD on 10/25/2024 revealed LA grade a reflux esophagitis.  -Lifestyle modifications for reflux previously provided.  He was previously prescribed omeprazole 20 mg daily.  Following EGD Dr. Burch prescribed pantoprazole 40 mg daily which he took for period of time following EGD but is no longer taking pantoprazole.   - Due to report of return of chest pain recommend he restart  pantoprazole daily for at least 2 months.  If chest pain resolves he may discontinue use and restart pantoprazole as needed.  - CMP with low glucose and otherwise normal 3/27/2025.  Consider checking magnesium level in the future.  - We previously discussed consideration for ultrasound gallbladder due to symptoms but did not proceed.  Consider ultrasound gallbladder in the future.  - Agree with plan for appointment with psychiatrist next week as it is possible anxiety could be contributing to symptoms    - pantoprazole (PROTONIX) 40 MG EC tablet; Take 1 tablet by mouth Daily. 30 to 60 minutes prior to a meal  Dispense: 90 tablet; Refill: 3  - There is documentation that prior authorization for pantoprazole should be approved until January 2028  3. Blood per rectum  - currently denies blood per rectum  - Recommend he notify the office if he has recurrent blood per rectum in the future and we will likely discuss consideration for colonoscopy  - Increase intake of dietary fiber and fiber supplement previously helpful for blood per rectum and suspected constipation.  MiraLAX previously recommended.    4. Allergic rhinitis  - Suspect he has allergic rhinitis contributing to symptoms for which I recommend he add Flonase, continue daily dose of Allegra.  Continue cough medication as needed    He has history of erythematous skin and rash for which hydrocortisone cream was recommended at time of office visit on 11/27/2024 with report of improved erythematous skin and rash after a few days of hydrocortisone cream  Return in about 3 months (around 8/30/2025).    Patient or patient representative verbalized consent for the use of Ambient Listening during the visit with  PARUL Lopez for chart documentation. 5/30/2025  1250 EDT    PARUL Lopez  05/30/2025